# Patient Record
Sex: FEMALE | Race: BLACK OR AFRICAN AMERICAN | NOT HISPANIC OR LATINO | Employment: UNEMPLOYED | URBAN - METROPOLITAN AREA
[De-identification: names, ages, dates, MRNs, and addresses within clinical notes are randomized per-mention and may not be internally consistent; named-entity substitution may affect disease eponyms.]

---

## 2024-10-30 ENCOUNTER — HOSPITAL ENCOUNTER (EMERGENCY)
Facility: HOSPITAL | Age: 59
Discharge: HOME | End: 2024-10-30
Attending: STUDENT IN AN ORGANIZED HEALTH CARE EDUCATION/TRAINING PROGRAM
Payer: COMMERCIAL

## 2024-10-30 VITALS
WEIGHT: 181 LBS | HEART RATE: 80 BPM | HEIGHT: 61 IN | TEMPERATURE: 98 F | SYSTOLIC BLOOD PRESSURE: 159 MMHG | RESPIRATION RATE: 19 BRPM | OXYGEN SATURATION: 97 % | BODY MASS INDEX: 34.17 KG/M2 | DIASTOLIC BLOOD PRESSURE: 86 MMHG
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
## 2024-10-30 DIAGNOSIS — S16.1XXA STRAIN OF NECK MUSCLE, INITIAL ENCOUNTER: ICD-10-CM

## 2024-10-30 DIAGNOSIS — M79.10 MYALGIA: ICD-10-CM

## 2024-10-30 PROCEDURE — 99284 EMERGENCY DEPT VISIT MOD MDM: CPT | Mod: 25

## 2024-10-30 RX ORDER — CYCLOBENZAPRINE HCL 5 MG
5 TABLET ORAL 3 TIMES DAILY PRN
Qty: 15 TABLET | Refills: 0 | Status: SHIPPED | OUTPATIENT
Start: 2024-10-30 | End: 2024-11-09

## 2024-10-30 RX ORDER — IBUPROFEN 800 MG/1
800 TABLET ORAL EVERY 6 HOURS PRN
Qty: 20 TABLET | Refills: 0 | Status: SHIPPED | OUTPATIENT
Start: 2024-10-30
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
## 2024-10-30 NOTE — DISCHARGE INSTRUCTIONS
For pain, take ibuprofen every 6 hours as needed. OK to alternate with Tylenol as needed.    Additionally, OK to take Flexeril as needed for worsening pain. This medication may make you drowsy.    OK to apply heat and ice to the areas of discomfort.    Follow up with primary care. Return to ER as needed.

## 2025-01-19 ENCOUNTER — HOSPITAL ENCOUNTER (EMERGENCY)
Facility: HOSPITAL | Age: 60
Discharge: HOME | End: 2025-01-19
Attending: FAMILY MEDICINE
Payer: MEDICARE

## 2025-01-19 VITALS
RESPIRATION RATE: 17 BRPM | TEMPERATURE: 98 F | BODY MASS INDEX: 30.89 KG/M2 | DIASTOLIC BLOOD PRESSURE: 74 MMHG | WEIGHT: 180.94 LBS | OXYGEN SATURATION: 97 % | HEART RATE: 68 BPM | SYSTOLIC BLOOD PRESSURE: 139 MMHG | HEIGHT: 64 IN

## 2025-01-19 DIAGNOSIS — S86.912A KNEE STRAIN, LEFT, INITIAL ENCOUNTER: Primary | ICD-10-CM

## 2025-01-19 DIAGNOSIS — T14.90XA TRAUMA: ICD-10-CM

## 2025-01-19 DIAGNOSIS — M25.562 ACUTE PAIN OF LEFT KNEE: ICD-10-CM

## 2025-01-19 PROCEDURE — 99284 EMERGENCY DEPT VISIT MOD MDM: CPT | Mod: 25

## 2025-01-19 PROCEDURE — 96372 THER/PROPH/DIAG INJ SC/IM: CPT | Performed by: FAMILY MEDICINE

## 2025-01-19 PROCEDURE — 63600175 PHARM REV CODE 636 W HCPCS: Mod: JZ,TB | Performed by: FAMILY MEDICINE

## 2025-01-19 RX ORDER — NAPROXEN 500 MG/1
500 TABLET ORAL 2 TIMES DAILY WITH MEALS
Qty: 20 TABLET | Refills: 0 | Status: SHIPPED | OUTPATIENT
Start: 2025-01-19

## 2025-01-19 RX ORDER — KETOROLAC TROMETHAMINE 30 MG/ML
60 INJECTION, SOLUTION INTRAMUSCULAR; INTRAVENOUS
Status: COMPLETED | OUTPATIENT
Start: 2025-01-19 | End: 2025-01-19

## 2025-01-19 RX ADMIN — KETOROLAC TROMETHAMINE 60 MG: 30 INJECTION, SOLUTION INTRAMUSCULAR; INTRAVENOUS at 06:01

## 2025-01-19 NOTE — Clinical Note
"Nita Triplett (Brenda) was seen and treated in our emergency department on 1/19/2025.  She may return to work on 01/20/2025.       If you have any questions or concerns, please don't hesitate to call.      Yesi Daugherty RN    "

## 2025-01-19 NOTE — Clinical Note
"Nita Eduardo" Uziel was seen and treated in our emergency department on 1/19/2025.  She may return to work on 01/15/2025.       If you have any questions or concerns, please don't hesitate to call.      Jessica Wiseman RN    "

## 2025-01-19 NOTE — Clinical Note
"Nita Eduardo" Uziel was seen and treated in our emergency department on 1/19/2025.  She may return to work on 01/23/2025.       If you have any questions or concerns, please don't hesitate to call.      Jessica Wiseman RN    "
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
## 2025-02-12 ENCOUNTER — HOSPITAL ENCOUNTER (EMERGENCY)
Facility: HOSPITAL | Age: 60
Discharge: HOME | End: 2025-02-12
Attending: EMERGENCY MEDICINE
Payer: MEDICARE

## 2025-02-12 VITALS
WEIGHT: 179.69 LBS | HEIGHT: 64 IN | TEMPERATURE: 98 F | DIASTOLIC BLOOD PRESSURE: 74 MMHG | SYSTOLIC BLOOD PRESSURE: 136 MMHG | BODY MASS INDEX: 30.68 KG/M2 | RESPIRATION RATE: 18 BRPM | OXYGEN SATURATION: 100 % | HEART RATE: 82 BPM

## 2025-02-12 DIAGNOSIS — H66.92 LEFT ACUTE OTITIS MEDIA: Primary | ICD-10-CM

## 2025-02-12 DIAGNOSIS — R05.9 COUGH: ICD-10-CM

## 2025-02-12 DIAGNOSIS — J06.9 UPPER RESPIRATORY TRACT INFECTION, UNSPECIFIED TYPE: ICD-10-CM

## 2025-02-12 PROBLEM — H91.3 DEAF, NONSPEAKING: Status: ACTIVE | Noted: 2025-02-12

## 2025-02-12 LAB
FLUAV AG UPPER RESP QL IA.RAPID: NOT DETECTED
FLUBV AG UPPER RESP QL IA.RAPID: NOT DETECTED
SARS-COV-2 RNA RESP QL NAA+PROBE: NOT DETECTED
STREP A PCR (OHS): NOT DETECTED

## 2025-02-12 PROCEDURE — 0240U COVID/FLU A&B PCR: CPT

## 2025-02-12 PROCEDURE — 87651 STREP A DNA AMP PROBE: CPT

## 2025-02-12 PROCEDURE — 25000003 PHARM REV CODE 250

## 2025-02-12 PROCEDURE — 99283 EMERGENCY DEPT VISIT LOW MDM: CPT | Mod: 25

## 2025-02-12 RX ORDER — GUAIFENESIN AND DEXTROMETHORPHAN HYDROBROMIDE 10; 100 MG/5ML; MG/5ML
10 SYRUP ORAL ONCE
Status: COMPLETED | OUTPATIENT
Start: 2025-02-12 | End: 2025-02-12

## 2025-02-12 RX ORDER — CETIRIZINE HYDROCHLORIDE 10 MG/1
10 TABLET ORAL DAILY
Qty: 30 TABLET | Refills: 0 | Status: SHIPPED | OUTPATIENT
Start: 2025-02-12 | End: 2025-03-14

## 2025-02-12 RX ORDER — CHLOPHEDIANOL HCL AND PYRILAMINE MALEATE 12.5; 12.5 MG/5ML; MG/5ML
10 SOLUTION ORAL EVERY 8 HOURS PRN
Qty: 200 ML | Refills: 0 | Status: SHIPPED | OUTPATIENT
Start: 2025-02-12 | End: 2025-02-19

## 2025-02-12 RX ORDER — AMOXICILLIN 875 MG/1
875 TABLET, FILM COATED ORAL 2 TIMES DAILY
Qty: 14 TABLET | Refills: 0 | Status: SHIPPED | OUTPATIENT
Start: 2025-02-12

## 2025-02-12 RX ORDER — ACETAMINOPHEN 500 MG
1000 TABLET ORAL
Status: COMPLETED | OUTPATIENT
Start: 2025-02-12 | End: 2025-02-12

## 2025-02-12 RX ORDER — FLUTICASONE PROPIONATE 50 MCG
1 SPRAY, SUSPENSION (ML) NASAL 2 TIMES DAILY PRN
Qty: 15 G | Refills: 0 | Status: SHIPPED | OUTPATIENT
Start: 2025-02-12

## 2025-02-12 RX ADMIN — ACETAMINOPHEN 1000 MG: 500 TABLET ORAL at 11:02

## 2025-02-12 RX ADMIN — GUAIFENESIN AND DEXTROMETHORPHAN 10 ML: 100; 10 SYRUP ORAL at 11:02

## 2025-02-12 NOTE — DISCHARGE INSTRUCTIONS
Wash hands often, Warm salt water gargles, Throat lozenges  Rotate tylenol and Ibuprofen while awake, Soft foods, Increase water intake   If you smoke, try to quit.   Drink lots of fluids like water, juice, or broth. This will help replace any fluids lost if you have a runny nose or fever. Warm tea or soup can help soothe a sore throat.  If the air in your home feels dry, use a cool mist humidifier. This can help a stuffy nose and make it easier to breathe.  You can also use saline nose drops to relieve stuffiness.  If you decide to take over-the-counter cough or cold medicines, follow the directions on the label carefully. Be sure you do not take more than 1 medicine that contains acetaminophen. Also, if you have a heart problem or high blood pressure, check with your doctor before you take any of these medicines.  Wash your hands often. Cough or sneeze into a tissue or your elbow instead of your hands. This will help keep others healthy.    
3 = A little assistance

## 2025-02-12 NOTE — ED PROVIDER NOTES
Encounter Date: 2/12/2025       History     Chief Complaint   Patient presents with    Sore Throat    Headache    Otalgia     C/o sore throat, headache, vomiting, ear pain x 2 weeks.      60-year-old female with past medical history of hypertension and and deafness presents to the emergency department with complaints of bilateral ear pain for 2 weeks, her left ear hurts worsen her right, her pain initially was intermittent however it has become constant, it is worsening.  She endorses a sore throat and intermittent episodes of vomiting for the past week.  She does believe that she had chills.  She denies weakness, chest pain, shortness of breath, diaphoresis, fatigue, neck pain, headache.     The history is provided by the patient. The history is limited by a language barrier. A  was used.     Review of patient's allergies indicates:  No Known Allergies  Past Medical History:   Diagnosis Date    Deaf     Hypertension      Past Surgical History:   Procedure Laterality Date    CHOLECYSTECTOMY      10 years ago     No family history on file.  Social History     Tobacco Use    Smoking status: Never    Smokeless tobacco: Never   Substance Use Topics    Alcohol use: Never    Drug use: Never     Review of Systems   Constitutional: Negative.    HENT: Negative.     Eyes:  Positive for pain. Negative for photophobia, discharge, redness, itching and visual disturbance.   Respiratory: Negative.     Cardiovascular: Negative.    Gastrointestinal: Negative.    Genitourinary: Negative.    Musculoskeletal: Negative.    Skin: Negative.    Neurological: Negative.    All other systems reviewed and are negative.      Physical Exam     Initial Vitals [02/12/25 1036]   BP Pulse Resp Temp SpO2   (!) 164/96 107 20 98.2 °F (36.8 °C) (!) 93 %      MAP       --         Physical Exam    Nursing note and vitals reviewed.  Constitutional: Vital signs are normal. She appears well-developed and well-nourished. She is not  diaphoretic. She is cooperative.  Non-toxic appearance. She does not have a sickly appearance. She does not appear ill. No distress.   HENT:   Head: Normocephalic and atraumatic.   Right Ear: Hearing, external ear and ear canal normal. A middle ear effusion is present.   Left Ear: Hearing and ear canal normal. There is swelling and tenderness. Tympanic membrane is erythematous and bulging.   Nose: Nose normal. Mouth/Throat: Uvula is midline and oropharynx is clear and moist.   Eyes: Conjunctivae, EOM and lids are normal. Pupils are equal, round, and reactive to light.   Neck: Trachea normal and phonation normal. Neck supple.   Normal range of motion.  Cardiovascular:  Normal rate, regular rhythm, S1 normal, S2 normal, normal heart sounds, intact distal pulses and normal pulses.           Pulmonary/Chest: Effort normal and breath sounds normal. No accessory muscle usage. No tachypnea and no bradypnea. No respiratory distress. She has no decreased breath sounds. She has no wheezes. She has no rhonchi. She has no rales.   Normal effort, symmetrical chest rise and fall, no accessory muscle use. Bilateral clear breath sounds in all fields anterior and posterior.      Abdominal: Abdomen is soft. Bowel sounds are normal. There is no abdominal tenderness.   Abdomen is soft, nontender, no peritoneal signs. Tolerating PO fluids.      Musculoskeletal:         General: Normal range of motion.      Cervical back: Normal range of motion and neck supple.     Neurological: She is alert and oriented to person, place, and time. She has normal strength. She displays a negative Romberg sign. Coordination and gait normal. GCS score is 15. GCS eye subscore is 4. GCS verbal subscore is 5. GCS motor subscore is 6.   Skin: Skin is warm, dry and intact. Capillary refill takes less than 2 seconds. No pallor.   Psychiatric: She has a normal mood and affect. Thought content normal.         ED Course   Procedures  Labs Reviewed   COVID/FLU A&B  PCR - Normal       Result Value    Influenza A PCR Not Detected      Influenza B PCR Not Detected      SARS-CoV-2 PCR Not Detected      Narrative:     The Xpert Xpress SARS-CoV-2/FLU/RSV plus is a rapid, multiplexed real-time PCR test intended for the simultaneous qualitative detection and differentiation of SARS-CoV-2, Influenza A, Influenza B, and respiratory syncytial virus (RSV) viral RNA in either nasopharyngeal swab or nasal swab specimens.         STREP GROUP A BY PCR - Normal    STREP A PCR ([facility]) Not Detected      Narrative:     The Xpert Xpress Strep A test is a rapid, qualitative in vitro diagnostic test for the detection of Streptococcus pyogenes (Group A ß-hemolytic Streptococcus, Strep A) in throat swab specimens from patients with signs and symptoms of pharyngitis.            Imaging Results              X-Ray Chest PA And Lateral (Final result)  Result time 02/12/25 11:15:32      Final result by Aiden Huizar MD (02/12/25 11:15:32)                   Impression:      No acute pulmonary process appreciated.      Electronically signed by: Aiden Huizar  Date:    02/12/2025  Time:    11:15               Narrative:    EXAMINATION:  XR CHEST PA AND LATERAL    CLINICAL HISTORY:  Cough, unspecified    TECHNIQUE:  Frontal and lateral radiographs of the chest    COMPARISON:  Radiography 01/03/2023    FINDINGS:  Normal cardiac silhouette. The lungs are well-inflated. No consolidation identified. No pleural effusion or discernible pneumothorax.                                       Medications   dextromethorphan-guaiFENesin  mg/5 ml liquid 10 mL (10 mLs Oral Given 2/12/25 1152)   acetaminophen tablet 1,000 mg (1,000 mg Oral Given 2/12/25 1152)     Medical Decision Making  Strep Throat, viral pharyngitis, mononucleosis, HIV, GC, Herpangina, Oral candida, diphtheria, among others    The patient is a 60 y.o. female who presents to the [facility] Emergency Department with a chief complaint of URI and bilateral  ear pain.  EPIC records were reviewed. Lab work was ordered and   reviewed. Findings stated above COVID influenza negative strep negative. The patient was provided with cough medicine resulting in minor relief of symptoms. The patient was discharged home with a diagnosis of acute otitis media, URI. The patient was advised to rest. It was recommended for the patient to follow up with primary care.  The patient was provided prescriptions for amoxicillin, Zyrtec, Flonase, ninja cough.  The patient's vital signs and condition remained stable while undergoing evaluation in the Emergency Department. The patient agreed with the plan for care.   The patient is nontoxic appearing, in no acute distress, with stable vital signs and resting comfortably. There is no objective evidence requiring urgent intervention or hospitalization. I provided counseling to the patient with regard to the medical condition, the treatment plan, specific conditions for return, and the importance of follow up. Detailed written and verbal instructions were provided to the patient and he/she expressed a verbal understanding of the discharge instructions and overall management plan. Reiterated the importance of medication administration and safety, advised the patient to follow up with primary care provider in 3-5 days or sooner if needed. All questions and concerns were addressed before leaving the Emergency Department. The patient is stable for discharge.       Amount and/or Complexity of Data Reviewed  Labs:  Decision-making details documented in ED Course.  Radiology: ordered. Decision-making details documented in ED Course.    Risk  OTC drugs.  Prescription drug management.               ED Course as of 02/12/25 1323   Wed Feb 12, 2025   1139 X-Ray Chest PA And Lateral  No acute abnormality   [RQ]   1315 COVID/FLU A&B PCR [RQ]   1315 Influenza A, Molecular: Not Detected [RQ]   1315 Influenza B, Molecular: Not Detected [RQ]   1315 SARS-CoV2  (COVID-19) Qualitative PCR: Not Detected [RQ]   1315 Strep Group A by PCR [RQ]   1315 STREP A PCR ([facility]): Not Detected [RQ]      ED Course User Index  [RQ] Apolonia Blair FNP                             Clinical Impression:  Final diagnoses:  [R05.9] Cough  [J06.9] Upper respiratory tract infection, unspecified type  [H66.92] Left acute otitis media (Primary)          ED Disposition Condition    Discharge Stable          ED Prescriptions       Medication Sig Dispense Start Date End Date Auth. Provider    amoxicillin (AMOXIL) 875 MG tablet Take 1 tablet (875 mg total) by mouth 2 (two) times daily. 14 tablet 2/12/2025 -- Apolonia Blair FNP    pyrilamine-chlophedianoL (NINJACOF) 12.5-12.5 mg/5 mL Liqd Take 10 mLs by mouth every 8 (eight) hours as needed (cough). 200 mL 2/12/2025 2/19/2025 Apolonia Blair FNP    cetirizine (ZYRTEC) 10 MG tablet Take 1 tablet (10 mg total) by mouth once daily. 30 tablet 2/12/2025 3/14/2025 Apolonia Blair FNP    fluticasone propionate (FLONASE) 50 mcg/actuation nasal spray 1 spray (50 mcg total) by Each Nostril route 2 (two) times daily as needed. 15 g 2/12/2025 -- Apolonia Blair FNP          Follow-up Information       Follow up With Specialties Details Why Contact Info [facility] Emergency [facility] Emergency Medicine  If symptoms worsen [address] [facility] [id number] [phone]    PCP  In 1 week  Follow up with PCP in 3-5 days.             Apolonia Blair FNP  02/12/25 7141

## 2025-03-03 ENCOUNTER — HOSPITAL ENCOUNTER (EMERGENCY)
Facility: HOSPITAL | Age: 60
Discharge: HOME | End: 2025-03-03
Attending: FAMILY MEDICINE
Payer: MEDICARE

## 2025-03-03 VITALS
TEMPERATURE: 97 F | SYSTOLIC BLOOD PRESSURE: 155 MMHG | OXYGEN SATURATION: 98 % | WEIGHT: 173 LBS | HEART RATE: 97 BPM | RESPIRATION RATE: 20 BRPM | DIASTOLIC BLOOD PRESSURE: 88 MMHG | HEIGHT: 64 IN | BODY MASS INDEX: 29.53 KG/M2

## 2025-03-03 DIAGNOSIS — J06.9 UPPER RESPIRATORY TRACT INFECTION, UNSPECIFIED TYPE: Primary | ICD-10-CM

## 2025-03-03 DIAGNOSIS — R05.9 COUGH: ICD-10-CM

## 2025-03-03 LAB
FLUAV AG UPPER RESP QL IA.RAPID: NOT DETECTED
FLUBV AG UPPER RESP QL IA.RAPID: NOT DETECTED
RSV A 5' UTR RNA NPH QL NAA+PROBE: NOT DETECTED
SARS-COV-2 RNA RESP QL NAA+PROBE: NOT DETECTED
STREP A PCR (OHS): NOT DETECTED

## 2025-03-03 PROCEDURE — 99283 EMERGENCY DEPT VISIT LOW MDM: CPT | Mod: 25

## 2025-03-03 PROCEDURE — 0241U COVID/RSV/FLU A&B PCR: CPT | Performed by: NURSE PRACTITIONER

## 2025-03-03 PROCEDURE — 87651 STREP A DNA AMP PROBE: CPT | Performed by: NURSE PRACTITIONER

## 2025-03-03 RX ORDER — BENZONATATE 100 MG/1
100 CAPSULE ORAL 3 TIMES DAILY PRN
Qty: 20 CAPSULE | Refills: 0 | Status: SHIPPED | OUTPATIENT
Start: 2025-03-03 | End: 2025-03-13

## 2025-03-04 NOTE — ED PROVIDER NOTES
Encounter Date: 3/3/2025       History     Chief Complaint   Patient presents with    Cough    Sore Throat    Vomiting    Diarrhea     C/o above symptoms x 9 days intermittently     The patient presents with occas nonprod cough, sore throat, nasal congestion, subjective fever, no cp or sob, no known covid-19 exposure.  The onset was 1-2 weeks ago.  The course/duration of symptoms is constant.  The degree at present is minimal.  The exacerbating factor is none.  The relieving factor is none.  Risk factors consist of none.  Prior episodes: occasional.  Associated symptoms: denies chest pain and denies shortness of breath.  Additional history: none. Video  used for American sign language.         Review of patient's allergies indicates:  No Known Allergies  Past Medical History:   Diagnosis Date    Deaf     Hypertension      Past Surgical History:   Procedure Laterality Date    CHOLECYSTECTOMY      10 years ago     No family history on file.  Social History[1]  Review of Systems   Constitutional:  Positive for fever.   HENT:  Positive for congestion and sore throat.    Respiratory:  Positive for cough. Negative for shortness of breath.    Cardiovascular:  Negative for chest pain.   Gastrointestinal:  Negative for nausea.   Genitourinary:  Negative for dysuria.   Musculoskeletal:  Negative for back pain.   Skin:  Negative for rash.   Neurological:  Negative for weakness.   Hematological:  Does not bruise/bleed easily.   All other systems reviewed and are negative.      Physical Exam     Initial Vitals [03/03/25 1754]   BP Pulse Resp Temp SpO2   (!) 201/91 97 20 97.2 °F (36.2 °C) 97 %      MAP       --         Physical Exam    Nursing note and vitals reviewed.  Constitutional: She appears well-developed and well-nourished.   HENT:   Head: Normocephalic and atraumatic.   Right Ear: Tympanic membrane normal.   Left Ear: Tympanic membrane normal.   Nose: Nose normal. Mouth/Throat: Uvula is midline, oropharynx is  clear and moist and mucous membranes are normal.   Neck: Neck supple.   Normal range of motion.  Cardiovascular:  Normal rate, regular rhythm, normal heart sounds and intact distal pulses.           Pulmonary/Chest: Effort normal and breath sounds normal. She has no decreased breath sounds.   Abdominal: Abdomen is soft and flat. Bowel sounds are normal. There is no abdominal tenderness.   Musculoskeletal:         General: Normal range of motion.      Cervical back: Normal range of motion and neck supple.     Neurological: She is alert. She has normal strength.   Skin: Skin is warm and dry.   Psychiatric: She has a normal mood and affect.         ED Course   Procedures  Labs Reviewed   COVID/RSV/FLU A&B PCR - Normal       Result Value    Influenza A PCR Not Detected      Influenza B PCR Not Detected      Respiratory Syncytial Virus PCR Not Detected      SARS-CoV-2 PCR Not Detected      Narrative:     The Xpert Xpress SARS-CoV-2/FLU/RSV plus is a rapid, multiplexed real-time PCR test intended for the simultaneous qualitative detection and differentiation of SARS-CoV-2, Influenza A, Influenza B, and respiratory syncytial virus (RSV) viral RNA in either nasopharyngeal swab or nasal swab specimens.         STREP GROUP A BY PCR - Normal    STREP A PCR ([facility]) Not Detected      Narrative:     The Xpert Xpress Strep A test is a rapid, qualitative in vitro diagnostic test for the detection of Streptococcus pyogenes (Group A ß-hemolytic Streptococcus, Strep A) in throat swab specimens from patients with signs and symptoms of pharyngitis.            Imaging Results              X-Ray Chest AP Portable (Final result)  Result time 03/03/25 20:29:40      Final result by Taye Jorge MD (03/03/25 20:29:40)                   Narrative:    EXAMINATION  XR CHEST AP PORTABLE    CLINICAL HISTORY  Cough, unspecified    TECHNIQUE  A total of 1 frontal image(s) submitted of the chest.    COMPARISON  12 February  2025    FINDINGS  Lines/tubes/devices: None visualized.    The cardiac silhouette and central vascular structures are unchanged.  The trachea is midline. No new or worsening consolidation is developed in the interval.  Rounded nodular opacities are without short interval change.  There is no large pleural effusion or convincing pneumothorax.    Regional osseous structures and extrathoracic soft tissues are similar.    IMPRESSION  No acute process or other adverse interval change.      Electronically signed by: Taye Jorge  Date:    03/03/2025  Time:    20:29                                     Medications - No data to display  Medical Decision Making  The patient presents with occas nonprod cough, sore throat, nasal congestion, subjective fever, no cp or sob, no known covid-19 exposure.  The onset was 1-2 weeks ago.  The course/duration of symptoms is constant.  The degree at present is minimal.  The exacerbating factor is none.  The relieving factor is none.  Risk factors consist of none.  Prior episodes: occasional.  Associated symptoms: denies chest pain and denies shortness of breath.  Additional history: none. Video  used for American sign language.       8:31 PM DISPOSITION: The patient is resting comfortably in no acute distress.  She is hemodynamically stable and is without objective evidence for acute process requiring urgent intervention or hospitalization. I provided counseling to patient with regard to condition, the treatment plan, specific conditions for return, and the importance of follow up. Detailed written and verbal instructions provided to patient and she expressed a verbal understanding of the discharge instructions and overall management plan. Reiterated the importance of medication administration and safety and advised patient to follow up with primary care provider in 3-5 days or sooner if needed.  Answered questions at this time. The patient is stable for discharge.       Amount  and/or Complexity of Data Reviewed  Radiology: ordered and independent interpretation performed. Decision-making details documented in ED Course.      Additional MDM:   Differential Diagnosis:   At this time differential diagnosis is but not limited to influenza, strep throat, covid, rsv, viral uri              ED Course as of 03/03/25 2033   Mon Mar 03, 2025   2031 X-Ray Chest AP Portable  Nothing acute [RB]   2031 Given strict ED return precautions. I have spoken with the patient and/or caregivers. I have explained the patient's condition, diagnoses and treatment plan based on the information available to me at this time. I have answered the patient's and/or caregiver's questions and addressed any concerns. The patient and/or caregivers have as good an understanding of the patient's diagnosis, condition and treatment plan as can be expected at this point. The vital signs have been stable. The patient's condition is stable and appropriate for discharge from the emergency department.      The patient will pursue further outpatient evaluation with the primary care physician or other designated or consulting physician as outlined in the discharge instructions. The patient and/or caregivers are agreeable to this plan of care and follow-up instructions have been explained in detail. The patient and/or caregivers have received these instructions in written format and have expressed an understanding of the discharge instructions. The patient and/or caregivers are aware that any significant change in condition or worsening of symptoms should prompt an immediate return to this or the closest emergency department or a call to 911.      [RB]      ED Course User Index  [RB] Giuseppe Bales, KADEN                           Clinical Impression:  Final diagnoses:  [R05.9] Cough  [J06.9] Upper respiratory tract infection, unspecified type (Primary)          ED Disposition Condition    Discharge Stable          ED Prescriptions        Medication Sig Dispense Start Date End Date Auth. Provider    benzonatate (TESSALON) 100 MG capsule Take 1 capsule (100 mg total) by mouth 3 (three) times daily as needed for Cough. 20 capsule 3/3/2025 3/13/2025 Giuseppe Bales ACNP          Follow-up Information       Follow up With Specialties Details Why Contact Info    follow up with your primary care provider in 3-5 days        [facility] Emergency Medicine  If symptoms worsen [address] [facility] [address] [phone]               [1]   Social History  Tobacco Use    Smoking status: Never    Smokeless tobacco: Never   Substance Use Topics    Alcohol use: Never    Drug use: Never        Giuseppe Bales ACNP  03/03/25 2033

## 2025-04-20 ENCOUNTER — HOSPITAL ENCOUNTER (EMERGENCY)
Facility: HOSPITAL | Age: 60
Discharge: HOME | End: 2025-04-20
Attending: FAMILY MEDICINE
Payer: MEDICARE

## 2025-04-20 VITALS
BODY MASS INDEX: 28.17 KG/M2 | WEIGHT: 165 LBS | SYSTOLIC BLOOD PRESSURE: 166 MMHG | HEIGHT: 64 IN | TEMPERATURE: 98 F | HEART RATE: 95 BPM | OXYGEN SATURATION: 97 % | DIASTOLIC BLOOD PRESSURE: 77 MMHG | RESPIRATION RATE: 16 BRPM

## 2025-04-20 DIAGNOSIS — S93.401A SPRAIN OF RIGHT ANKLE, UNSPECIFIED LIGAMENT, INITIAL ENCOUNTER: Primary | ICD-10-CM

## 2025-04-20 PROCEDURE — 99283 EMERGENCY DEPT VISIT LOW MDM: CPT | Mod: 25

## 2025-04-20 PROCEDURE — 25000003 PHARM REV CODE 250

## 2025-04-20 RX ORDER — DICLOFENAC SODIUM 75 MG/1
75 TABLET, DELAYED RELEASE ORAL 2 TIMES DAILY
Qty: 14 TABLET | Refills: 0 | Status: SHIPPED | OUTPATIENT
Start: 2025-04-20 | End: 2025-04-27

## 2025-04-20 RX ORDER — IBUPROFEN 400 MG/1
800 TABLET ORAL
Status: COMPLETED | OUTPATIENT
Start: 2025-04-20 | End: 2025-04-20

## 2025-04-20 RX ADMIN — IBUPROFEN 800 MG: 400 TABLET, FILM COATED ORAL at 09:04
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
## 2025-05-06 LAB — BCS RECOMMENDATION EXT: NORMAL

## 2025-06-11 ENCOUNTER — PATIENT OUTREACH (OUTPATIENT)
Facility: CLINIC | Age: 60
End: 2025-06-11
Payer: MEDICARE